# Patient Record
Sex: MALE | Race: WHITE | NOT HISPANIC OR LATINO | URBAN - METROPOLITAN AREA
[De-identification: names, ages, dates, MRNs, and addresses within clinical notes are randomized per-mention and may not be internally consistent; named-entity substitution may affect disease eponyms.]

---

## 2018-11-24 ENCOUNTER — EMERGENCY (EMERGENCY)
Facility: HOSPITAL | Age: 83
LOS: 0 days | Discharge: HOME | End: 2018-11-24
Attending: EMERGENCY MEDICINE | Admitting: EMERGENCY MEDICINE
Payer: MEDICARE

## 2018-11-24 VITALS
HEART RATE: 87 BPM | TEMPERATURE: 98 F | SYSTOLIC BLOOD PRESSURE: 116 MMHG | RESPIRATION RATE: 17 BRPM | OXYGEN SATURATION: 97 % | DIASTOLIC BLOOD PRESSURE: 74 MMHG

## 2018-11-24 VITALS — WEIGHT: 199.96 LBS | HEIGHT: 70 IN

## 2018-11-24 DIAGNOSIS — Z79.82 LONG TERM (CURRENT) USE OF ASPIRIN: ICD-10-CM
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
## 2018-11-24 DIAGNOSIS — E78.5 HYPERLIPIDEMIA, UNSPECIFIED: ICD-10-CM

## 2018-11-24 DIAGNOSIS — S30.0XXA CONTUSION OF LOWER BACK AND PELVIS, INITIAL ENCOUNTER: ICD-10-CM

## 2018-11-24 DIAGNOSIS — E86.0 DEHYDRATION: ICD-10-CM

## 2018-11-24 DIAGNOSIS — R53.1 WEAKNESS: ICD-10-CM
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
## 2018-11-24 LAB
ALBUMIN SERPL ELPH-MCNC: 3 G/DL — LOW (ref 3.3–5)
ALP SERPL-CCNC: 71 U/L — SIGNIFICANT CHANGE UP (ref 40–120)
ALT FLD-CCNC: 51 U/L — SIGNIFICANT CHANGE UP (ref 12–78)
ANION GAP SERPL CALC-SCNC: 9 MMOL/L — SIGNIFICANT CHANGE UP (ref 5–17)
APPEARANCE UR: CLEAR — SIGNIFICANT CHANGE UP
APTT BLD: 29 SEC — SIGNIFICANT CHANGE UP (ref 27.5–36.3)
AST SERPL-CCNC: 57 U/L — HIGH (ref 15–37)
BASOPHILS # BLD AUTO: 0.02 K/UL — SIGNIFICANT CHANGE UP (ref 0–0.2)
BASOPHILS NFR BLD AUTO: 0.5 % — SIGNIFICANT CHANGE UP (ref 0–2)
BILIRUB SERPL-MCNC: 0.7 MG/DL — SIGNIFICANT CHANGE UP (ref 0.2–1.2)
BILIRUB UR-MCNC: NEGATIVE — SIGNIFICANT CHANGE UP
BUN SERPL-MCNC: 39 MG/DL — HIGH (ref 7–23)
CALCIUM SERPL-MCNC: 8.6 MG/DL — SIGNIFICANT CHANGE UP (ref 8.5–10.1)
CHLORIDE SERPL-SCNC: 107 MMOL/L — SIGNIFICANT CHANGE UP (ref 96–108)
CK SERPL-CCNC: 805 U/L — HIGH (ref 26–308)
CO2 SERPL-SCNC: 26 MMOL/L — SIGNIFICANT CHANGE UP (ref 22–31)
COLOR SPEC: YELLOW — SIGNIFICANT CHANGE UP
CREAT SERPL-MCNC: 1.46 MG/DL — HIGH (ref 0.5–1.3)
DIFF PNL FLD: ABNORMAL
EOSINOPHIL # BLD AUTO: 0.15 K/UL — SIGNIFICANT CHANGE UP (ref 0–0.5)
EOSINOPHIL NFR BLD AUTO: 3.8 % — SIGNIFICANT CHANGE UP (ref 0–6)
GLUCOSE SERPL-MCNC: 120 MG/DL — HIGH (ref 70–99)
GLUCOSE UR QL: NEGATIVE MG/DL — SIGNIFICANT CHANGE UP
HCT VFR BLD CALC: 35.2 % — LOW (ref 39–50)
HGB BLD-MCNC: 11.5 G/DL — LOW (ref 13–17)
IMM GRANULOCYTES NFR BLD AUTO: 0.5 % — SIGNIFICANT CHANGE UP (ref 0–1.5)
INR BLD: 1.25 RATIO — HIGH (ref 0.88–1.16)
KETONES UR-MCNC: NEGATIVE — SIGNIFICANT CHANGE UP
LEUKOCYTE ESTERASE UR-ACNC: NEGATIVE — SIGNIFICANT CHANGE UP
LYMPHOCYTES # BLD AUTO: 0.72 K/UL — LOW (ref 1–3.3)
LYMPHOCYTES # BLD AUTO: 18.1 % — SIGNIFICANT CHANGE UP (ref 13–44)
MCHC RBC-ENTMCNC: 30.7 PG — SIGNIFICANT CHANGE UP (ref 27–34)
MCHC RBC-ENTMCNC: 32.7 GM/DL — SIGNIFICANT CHANGE UP (ref 32–36)
MCV RBC AUTO: 94.1 FL — SIGNIFICANT CHANGE UP (ref 80–100)
MONOCYTES # BLD AUTO: 0.35 K/UL — SIGNIFICANT CHANGE UP (ref 0–0.9)
MONOCYTES NFR BLD AUTO: 8.8 % — SIGNIFICANT CHANGE UP (ref 2–14)
NEUTROPHILS # BLD AUTO: 2.71 K/UL — SIGNIFICANT CHANGE UP (ref 1.8–7.4)
NEUTROPHILS NFR BLD AUTO: 68.3 % — SIGNIFICANT CHANGE UP (ref 43–77)
NITRITE UR-MCNC: NEGATIVE — SIGNIFICANT CHANGE UP
PH UR: 5 — SIGNIFICANT CHANGE UP (ref 5–8)
PLATELET # BLD AUTO: 150 K/UL — SIGNIFICANT CHANGE UP (ref 150–400)
POTASSIUM SERPL-MCNC: 3.8 MMOL/L — SIGNIFICANT CHANGE UP (ref 3.5–5.3)
POTASSIUM SERPL-SCNC: 3.8 MMOL/L — SIGNIFICANT CHANGE UP (ref 3.5–5.3)
PROT SERPL-MCNC: 7 GM/DL — SIGNIFICANT CHANGE UP (ref 6–8.3)
PROT UR-MCNC: 15 MG/DL
PROTHROM AB SERPL-ACNC: 14 SEC — HIGH (ref 10–12.9)
RBC # BLD: 3.74 M/UL — LOW (ref 4.2–5.8)
RBC # FLD: 14.2 % — SIGNIFICANT CHANGE UP (ref 10.3–14.5)
SODIUM SERPL-SCNC: 142 MMOL/L — SIGNIFICANT CHANGE UP (ref 135–145)
SP GR SPEC: 1.02 — SIGNIFICANT CHANGE UP (ref 1.01–1.02)
TROPONIN I SERPL-MCNC: <0.015 NG/ML — SIGNIFICANT CHANGE UP (ref 0.01–0.04)
UROBILINOGEN FLD QL: 1 MG/DL
WBC # BLD: 3.97 K/UL — SIGNIFICANT CHANGE UP (ref 3.8–10.5)
WBC # FLD AUTO: 3.97 K/UL — SIGNIFICANT CHANGE UP (ref 3.8–10.5)

## 2018-11-24 PROCEDURE — 99284 EMERGENCY DEPT VISIT MOD MDM: CPT | Mod: 25

## 2018-11-24 PROCEDURE — 74176 CT ABD & PELVIS W/O CONTRAST: CPT | Mod: 26

## 2018-11-24 PROCEDURE — 71045 X-RAY EXAM CHEST 1 VIEW: CPT | Mod: 26

## 2018-11-24 PROCEDURE — 72170 X-RAY EXAM OF PELVIS: CPT | Mod: 26

## 2018-11-24 PROCEDURE — 93010 ELECTROCARDIOGRAM REPORT: CPT

## 2018-11-24 PROCEDURE — 71250 CT THORAX DX C-: CPT | Mod: 26

## 2018-11-24 PROCEDURE — 70450 CT HEAD/BRAIN W/O DYE: CPT | Mod: 26

## 2018-11-24 PROCEDURE — 72125 CT NECK SPINE W/O DYE: CPT | Mod: 26

## 2018-11-24 RX ORDER — SODIUM CHLORIDE 9 MG/ML
1000 INJECTION INTRAMUSCULAR; INTRAVENOUS; SUBCUTANEOUS ONCE
Qty: 0 | Refills: 0 | Status: COMPLETED | OUTPATIENT
Start: 2018-11-24 | End: 2018-11-24

## 2018-11-24 RX ORDER — SODIUM CHLORIDE 9 MG/ML
500 INJECTION INTRAMUSCULAR; INTRAVENOUS; SUBCUTANEOUS ONCE
Qty: 0 | Refills: 0 | Status: DISCONTINUED | OUTPATIENT
Start: 2018-11-24 | End: 2018-11-24

## 2018-11-24 RX ADMIN — SODIUM CHLORIDE 1000 MILLILITER(S): 9 INJECTION INTRAMUSCULAR; INTRAVENOUS; SUBCUTANEOUS at 18:39

## 2018-11-24 RX ADMIN — SODIUM CHLORIDE 1000 MILLILITER(S): 9 INJECTION INTRAMUSCULAR; INTRAVENOUS; SUBCUTANEOUS at 17:37

## 2018-11-24 NOTE — ED PROVIDER NOTE - ENMT, MLM
Normocephalic, atraumatic. No acharya signs or signs of facial trauma. Oropharynx clear, mucous membranes mildly dry. Throat has no vesicles, no oropharyngeal exudates and uvula is midline.

## 2018-11-24 NOTE — ED PROVIDER NOTE - MUSCULOSKELETAL, MLM
Neck nontender, supple. Back with no focal TTP or vertebral step off deformities, posterior thorax stable, nontender. Pelvis nontender, stable. MARSHALL x4, b/l SLR 40 degrees, no focal swelling or tenderness.

## 2018-11-24 NOTE — ED STATDOCS - PROGRESS NOTE DETAILS
Puneet Corrigan for Dr. MARCO Potts: 84 y/o male with a PMHx of HLD, on baby ASA presents to the ED s/p fall yesterday. Pt states he was bending down to pick something up and fell to the floor. Pt notes he laying on the floor for a few hours because was not able to stand up on his own before he was found by son. Now, pt is c/o joint pain and weakness. He reports he normally does not have difficulty standing up. Unsure of hitting head. Daughter states pt was c/o dizziness before the fall. Denies CP, SOB, abd pain, NVD, or painful urination. Uses a cane to ambulate at baseline. Will send pt to main ED for further evaluation. Puneet Corrigan for Dr. MARCO Potts: 82 y/o male with a PMHx of HLD, on baby ASA presents to the ED s/p fall yesterday. Pt states he was bending down to pick something up and fell to the floor. Pt notes he laying on the floor for hours because was not able to stand up on his own before he was found by son. Now, pt is c/o joint pain and weakness. He reports he normally does not have difficulty standing up. Unsure of hitting head. Daughter states pt was c/o dizziness before the fall. Denies CP, SOB, abd pain, NVD, or painful urination. Will send pt to main ED for further evaluation.

## 2018-11-24 NOTE — ED PROVIDER NOTE - CARE PLAN
Principal Discharge DX:	Dehydration  Secondary Diagnosis:	Back contusion, unspecified laterality, initial encounter  Secondary Diagnosis:	General weakness

## 2018-11-24 NOTE — ED ADULT NURSE NOTE - NSIMPLEMENTINTERV_GEN_ALL_ED
Implemented All Fall Risk Interventions:  [place] to call system. Call bell, personal items and telephone within reach. Instruct patient to call for assistance. Room bathroom lighting operational. Non-slip footwear when patient is off stretcher. Physically safe environment: no spills, clutter or unnecessary equipment. Stretcher in lowest position, wheels locked, appropriate side rails in place. Provide visual cue, wrist band, yellow gown, etc. Monitor gait and stability. Monitor for mental status changes and reorient to person, place, and time. Review medications for side effects contributing to fall risk. Reinforce activity limits and safety measures with patient and family.

## 2018-11-24 NOTE — ED PROVIDER NOTE - PROGRESS NOTE DETAILS
Dr. Zimmer:  Reevaluated patient at bedside.  Patient feeling much improved, IVF finishing.  Informed by RN that pt has steady gait w/ cane assistance.  Discussed the results of all diagnostic testing in ED and copies of all reports given.   An opportunity to ask questions was given.  Discussed the importance of prompt, close medical follow-up.  Patient will return with any changes, concerns or persistent / worsening symptoms.  Understanding of all instructions verbalized.

## 2018-11-24 NOTE — ED PROVIDER NOTE - OBJECTIVE STATEMENT
82 y/o male with a PMHx of HLD, on 81mg ASA presents to the ED s/p fall yesterday. Pt states he was bending down to pick something up and fell to the floor. Pt states he was lying on the floor for 2-3 hours until his son found him because he was too weak to stand up without assistance. Pt states he typically does not need assistance standing up. Unknown if hit head. Pt able to ambulate after fall with assistance of cane, but notes not being able to get up from a chair this afternoon without assistance. At time of eval, pt reports generalized weakness. Ate dinner last night without difficulty. Denies nausea, vomiting, head pain. Daughter at bedside states pt never had to use a cane before and "seemed fine" when she last saw him on 11/22/18. No other acute complaints at time of eval.

## 2018-11-24 NOTE — ED ADULT TRIAGE NOTE - CHIEF COMPLAINT QUOTE
pt states he fell this morning approx 7am and was unable to get up until his son found him approx 1 hour later, now c/o pain to buttock. pt is ambulatory at this time.

## 2018-11-24 NOTE — ED PROVIDER NOTE - MEDICAL DECISION MAKING DETAILS
84 y/o M on 81mg ASA daily presents to the ED brought in by family for evaluation of generalized weakness s/p fall yesterday, ? head injury. Pt appears dehydrated but no focal extremity weakness/tenderness. Plan for CT head/c-spine/chest/abd/pelvis non-con, XR pelvis, EKG, labs including troponin, urine, pulse ox monitor, IV fluids, observe, monitor, and reassess.

## 2018-11-24 NOTE — ED STATDOCS - NS_ ATTENDINGSCRIBEDETAILS _ED_A_ED_FT
I, Alvarez Potts MD,  performed the initial face to face bedside interview with this patient regarding history of present illness, review of symptoms and relevant past medical, social and family history.  I completed an independent physical examination.  I was the initial provider who evaluated this patient.  The history, relevant review of systems, past medical and surgical history, medical decision making, and physical examination was documented by the scribe in my presence and I attest to the accuracy of the documentation.

## 2018-11-24 NOTE — ED ADULT NURSE NOTE - OBJECTIVE STATEMENT
Pt BIB family with report of possible fall. Pt is a poor historian. Pt daughter reports that he needed to be helped up from floor by his son sometime within the past two days. Pt unclear on reasons for fall, but does report that he has been feeling generally weak over past few days. Pt denies dizziness or pain at this time.  Pt labs as ordered and taken to CT scan at this time.  No acute sign of injury noted.

## 2018-11-25 LAB
CULTURE RESULTS: NO GROWTH — SIGNIFICANT CHANGE UP
SPECIMEN SOURCE: SIGNIFICANT CHANGE UP

## 2019-02-21 ENCOUNTER — INPATIENT (INPATIENT)
Facility: HOSPITAL | Age: 84
LOS: 1 days | Discharge: HOME | End: 2019-02-23
Attending: INTERNAL MEDICINE | Admitting: INTERNAL MEDICINE
Payer: MEDICARE

## 2019-02-21 VITALS
RESPIRATION RATE: 18 BRPM | DIASTOLIC BLOOD PRESSURE: 67 MMHG | WEIGHT: 220.02 LBS | OXYGEN SATURATION: 100 % | HEIGHT: 70 IN | TEMPERATURE: 98 F | SYSTOLIC BLOOD PRESSURE: 138 MMHG | HEART RATE: 95 BPM

## 2019-02-21 DIAGNOSIS — R09.89 OTHER SPECIFIED SYMPTOMS AND SIGNS INVOLVING THE CIRCULATORY AND RESPIRATORY SYSTEMS: ICD-10-CM

## 2019-02-21 PROBLEM — E78.5 HYPERLIPIDEMIA, UNSPECIFIED: Chronic | Status: ACTIVE | Noted: 2018-11-24

## 2019-02-21 LAB
ACANTHOCYTES BLD QL SMEAR: SLIGHT — SIGNIFICANT CHANGE UP
ADD ON TEST-SPECIMEN IN LAB: SIGNIFICANT CHANGE UP
ALBUMIN SERPL ELPH-MCNC: 3.1 G/DL — LOW (ref 3.3–5)
ALP SERPL-CCNC: 100 U/L — SIGNIFICANT CHANGE UP (ref 40–120)
ALT FLD-CCNC: 24 U/L — SIGNIFICANT CHANGE UP (ref 12–78)
ANION GAP SERPL CALC-SCNC: 6 MMOL/L — SIGNIFICANT CHANGE UP (ref 5–17)
ANISOCYTOSIS BLD QL: SLIGHT — SIGNIFICANT CHANGE UP
APPEARANCE UR: CLEAR — SIGNIFICANT CHANGE UP
APTT BLD: 28.7 SEC — SIGNIFICANT CHANGE UP (ref 27.5–36.3)
APTT BLD: 31.7 SEC — SIGNIFICANT CHANGE UP (ref 27.5–36.3)
AST SERPL-CCNC: 23 U/L — SIGNIFICANT CHANGE UP (ref 15–37)
BASOPHILS # BLD AUTO: 0.02 K/UL — SIGNIFICANT CHANGE UP (ref 0–0.2)
BASOPHILS NFR BLD AUTO: 2 % — SIGNIFICANT CHANGE UP (ref 0–2)
BILIRUB SERPL-MCNC: 0.6 MG/DL — SIGNIFICANT CHANGE UP (ref 0.2–1.2)
BILIRUB UR-MCNC: NEGATIVE — SIGNIFICANT CHANGE UP
BLD GP AB SCN SERPL QL: SIGNIFICANT CHANGE UP
BUN SERPL-MCNC: 34 MG/DL — HIGH (ref 7–23)
CALCIUM SERPL-MCNC: 8.4 MG/DL — LOW (ref 8.5–10.1)
CHLORIDE SERPL-SCNC: 106 MMOL/L — SIGNIFICANT CHANGE UP (ref 96–108)
CO2 SERPL-SCNC: 25 MMOL/L — SIGNIFICANT CHANGE UP (ref 22–31)
COLOR SPEC: YELLOW — SIGNIFICANT CHANGE UP
CREAT SERPL-MCNC: 1.28 MG/DL — SIGNIFICANT CHANGE UP (ref 0.5–1.3)
DACRYOCYTES BLD QL SMEAR: SLIGHT — SIGNIFICANT CHANGE UP
DIFF PNL FLD: NEGATIVE — SIGNIFICANT CHANGE UP
EOSINOPHIL # BLD AUTO: 0.02 K/UL — SIGNIFICANT CHANGE UP (ref 0–0.5)
EOSINOPHIL NFR BLD AUTO: 2 % — SIGNIFICANT CHANGE UP (ref 0–6)
FERRITIN SERPL-MCNC: 777 NG/ML — HIGH (ref 30–400)
GLUCOSE SERPL-MCNC: 105 MG/DL — HIGH (ref 70–99)
GLUCOSE UR QL: NEGATIVE MG/DL — SIGNIFICANT CHANGE UP
HCT VFR BLD CALC: 19.5 % — CRITICAL LOW (ref 39–50)
HCT VFR BLD CALC: 23.2 % — LOW (ref 39–50)
HGB BLD-MCNC: 6.4 G/DL — CRITICAL LOW (ref 13–17)
HGB BLD-MCNC: 7.8 G/DL — LOW (ref 13–17)
HYPOCHROMIA BLD QL: SLIGHT — SIGNIFICANT CHANGE UP
HYPOGRANULAR PLT: PRESENT
INR BLD: 1.16 RATIO — SIGNIFICANT CHANGE UP (ref 0.88–1.16)
INR BLD: 1.25 RATIO — HIGH (ref 0.88–1.16)
KETONES UR-MCNC: NEGATIVE — SIGNIFICANT CHANGE UP
LDH SERPL L TO P-CCNC: 120 U/L — SIGNIFICANT CHANGE UP (ref 84–241)
LEUKOCYTE ESTERASE UR-ACNC: NEGATIVE — SIGNIFICANT CHANGE UP
LYMPHOCYTES # BLD AUTO: 0.64 K/UL — LOW (ref 1–3.3)
LYMPHOCYTES # BLD AUTO: 64 % — HIGH (ref 13–44)
MANUAL SMEAR VERIFICATION: SIGNIFICANT CHANGE UP
MCHC RBC-ENTMCNC: 32.8 GM/DL — SIGNIFICANT CHANGE UP (ref 32–36)
MCHC RBC-ENTMCNC: 33.7 PG — SIGNIFICANT CHANGE UP (ref 27–34)
MCV RBC AUTO: 102.6 FL — HIGH (ref 80–100)
MONOCYTES # BLD AUTO: 0.05 K/UL — SIGNIFICANT CHANGE UP (ref 0–0.9)
MONOCYTES NFR BLD AUTO: 5 % — SIGNIFICANT CHANGE UP (ref 2–14)
NEUTROPHILS # BLD AUTO: 0.27 K/UL — LOW (ref 1.8–7.4)
NEUTROPHILS NFR BLD AUTO: 27 % — LOW (ref 43–77)
NITRITE UR-MCNC: NEGATIVE — SIGNIFICANT CHANGE UP
NRBC # BLD: 0 /100 — SIGNIFICANT CHANGE UP (ref 0–0)
NRBC # BLD: SIGNIFICANT CHANGE UP /100 WBCS (ref 0–0)
NT-PROBNP SERPL-SCNC: 775 PG/ML — HIGH (ref 0–450)
OVALOCYTES BLD QL SMEAR: SIGNIFICANT CHANGE UP
PH UR: 6.5 — SIGNIFICANT CHANGE UP (ref 5–8)
PLAT MORPH BLD: ABNORMAL
PLATELET # BLD AUTO: 48 K/UL — LOW (ref 150–400)
POIKILOCYTOSIS BLD QL AUTO: SIGNIFICANT CHANGE UP
POLYCHROMASIA BLD QL SMEAR: SLIGHT — SIGNIFICANT CHANGE UP
POTASSIUM SERPL-MCNC: 3.8 MMOL/L — SIGNIFICANT CHANGE UP (ref 3.5–5.3)
POTASSIUM SERPL-SCNC: 3.8 MMOL/L — SIGNIFICANT CHANGE UP (ref 3.5–5.3)
PROT SERPL-MCNC: 6.8 GM/DL — SIGNIFICANT CHANGE UP (ref 6–8.3)
PROT UR-MCNC: NEGATIVE MG/DL — SIGNIFICANT CHANGE UP
PROTHROM AB SERPL-ACNC: 12.9 SEC — SIGNIFICANT CHANGE UP (ref 10–12.9)
PROTHROM AB SERPL-ACNC: 14 SEC — HIGH (ref 10–12.9)
RBC # BLD: 1.9 M/UL — LOW (ref 4.2–5.8)
RBC # FLD: 17.5 % — HIGH (ref 10.3–14.5)
RBC BLD AUTO: SIGNIFICANT CHANGE UP
RETICS #: 55.4 K/UL — SIGNIFICANT CHANGE UP (ref 25–125)
RETICS/RBC NFR: 2.9 % — HIGH (ref 0.5–2.5)
ROULEAUX BLD QL SMEAR: PRESENT
SCHISTOCYTES BLD QL AUTO: SLIGHT — SIGNIFICANT CHANGE UP
SODIUM SERPL-SCNC: 137 MMOL/L — SIGNIFICANT CHANGE UP (ref 135–145)
SP GR SPEC: 1.01 — SIGNIFICANT CHANGE UP (ref 1.01–1.02)
TRANSFERRIN SERPL-MCNC: 221 MG/DL — SIGNIFICANT CHANGE UP (ref 200–360)
TROPONIN I SERPL-MCNC: <0.015 NG/ML — SIGNIFICANT CHANGE UP (ref 0.01–0.04)
TYPE + AB SCN PNL BLD: SIGNIFICANT CHANGE UP
UROBILINOGEN FLD QL: NEGATIVE MG/DL — SIGNIFICANT CHANGE UP
WBC # BLD: 1 K/UL — CRITICAL LOW (ref 3.8–10.5)
WBC # FLD AUTO: 1 K/UL — CRITICAL LOW (ref 3.8–10.5)

## 2019-02-21 PROCEDURE — 93010 ELECTROCARDIOGRAM REPORT: CPT

## 2019-02-21 PROCEDURE — 99285 EMERGENCY DEPT VISIT HI MDM: CPT

## 2019-02-21 PROCEDURE — 93970 EXTREMITY STUDY: CPT | Mod: 26

## 2019-02-21 PROCEDURE — 71045 X-RAY EXAM CHEST 1 VIEW: CPT | Mod: 26

## 2019-02-21 RX ORDER — ONDANSETRON 8 MG/1
4 TABLET, FILM COATED ORAL EVERY 6 HOURS
Qty: 0 | Refills: 0 | Status: DISCONTINUED | OUTPATIENT
Start: 2019-02-21 | End: 2019-02-23

## 2019-02-21 RX ORDER — SENNA PLUS 8.6 MG/1
2 TABLET ORAL AT BEDTIME
Qty: 0 | Refills: 0 | Status: DISCONTINUED | OUTPATIENT
Start: 2019-02-21 | End: 2019-02-23

## 2019-02-21 RX ORDER — DOCUSATE SODIUM 100 MG
100 CAPSULE ORAL THREE TIMES A DAY
Qty: 0 | Refills: 0 | Status: DISCONTINUED | OUTPATIENT
Start: 2019-02-21 | End: 2019-02-23

## 2019-02-21 RX ORDER — LOVASTATIN 20 MG
0 TABLET ORAL
Qty: 0 | Refills: 0 | COMMUNITY

## 2019-02-21 RX ORDER — ACETAMINOPHEN 500 MG
650 TABLET ORAL EVERY 6 HOURS
Qty: 0 | Refills: 0 | Status: DISCONTINUED | OUTPATIENT
Start: 2019-02-21 | End: 2019-02-23

## 2019-02-21 NOTE — ED ADULT TRIAGE NOTE - CHIEF COMPLAINT QUOTE
called in by Dr. Moreno to come in for abnormal outpatient lab on 2/20. HGB 6.2, WBC 1.0, Neutrophils 246, PLT 56. Pt in no pain or complaints.

## 2019-02-21 NOTE — H&P ADULT - ASSESSMENT
83 y.o male with PMH of HLD and allergic rhinitis admitted for:     1.  New onset Pancytopenia.   Labs from  November 2018 with mild anemia  No recent viral infections  Possible medication vs BM pathology?  Admit to med/surg  floor   Neutropenic precautions   Hold lovastatin, will clarify how long takes medication  Low fat diet   Will check: Iron studies (sent by ED before transfusion given), Retic count, LDH, Haptoglobin, HIV,  acute hepatitis panel, SPEP, UPEP, PT/PTT/INR , B12/folate   Check Occult blood   getting 2nd Unit PRBCs  Hem/onc eval      2. LE edema   Check b/l LE Doppler   BNP  ECHO   weight daily       3.  Elevated BUN/CR due to dehydration/hypovolemia   vs baseline ( improved since  last ED visit)   Monitor UO and renal FX  UA unremarkable     4. HLD   Hold lovastatin as can cause low PLts, hemolytic anemia and leukopenia   Low fat diet       5. Allergic rhinitis   Nasal  saline     6.  DVT PPxs   Venodynes  if DVT r/o, no     7.  Advanced care planning. Results and POC and GOC  d/w Pt and daughter in details. Daughter Yadira Felix is HCP. Pt states that would like to  attempt CPR trial in case of emergency. FULL CODE   total time spent 12 min

## 2019-02-21 NOTE — H&P ADULT - NSHPPHYSICALEXAM_GEN_ALL_CORE
Vital Signs Last 24 Hrs  T(C): 36.5 (21 Feb 2019 10:05), Max: 36.8 (21 Feb 2019 04:07)  T(F): 97.7 (21 Feb 2019 10:05), Max: 98.3 (21 Feb 2019 04:07)  HR: 80 (21 Feb 2019 10:20) (77 - 95)  BP: 119/72 (21 Feb 2019 10:20) (118/66 - 138/67)  RR: 16 (21 Feb 2019 10:20) (16 - 18)  SpO2: 100% (21 Feb 2019 10:20) (98% - 100%)    PHYSICAL EXAM:  General: Well developed; pale; in no acute distress.  Eyes: PERRLA, EOMI; conjunctiva and sclera clear  Head: Normocephalic; atraumatic  ENMT: No nasal discharge; airway clear.  [place]  Neck: Supple; non tender; no masses  Respiratory: Good air entry. No wheezes, rales or rhonchi  Cardiovascular: Regular rate and rhythm. S1 and S2 Normal; No murmurs  Gastrointestinal: Soft non-tender non-distended; Normal bowel sounds  Genitourinary: No costovertebral angle tenderness, no suprapubic tenderness  Extremities:  Preserved  range of motion, +2 LE edema b/l, b/l hand edema   Vascular: Peripheral pulses palpable 2+ bilaterally  Neurological: Alert and oriented x4, non focal   Skin: Warm and dry. No acute rash  Lymph Nodes: No acute cervical adenopathy  Musculoskeletal: Normal muscle  tone, without deformities  Psychiatric: Cooperative and appropriate

## 2019-02-21 NOTE — H&P ADULT - HISTORY OF PRESENT ILLNESS
83 y.o male with PMH of HLD and allergic rhinitis, hearing loss  was sent by PCP to ED for evaluation due to abnormal lab results. As per  Pt and daughter at beside Pt is being evaluated for generalized weakness which is started about few months ago. associated with lightheadedness, difficult ambulating, b/l LE and hand edema which is fairly new. Also  daughter reports that Pt lost  some weight ( about 20 lbs) as per Pt was cutting down on  some foods, also as per triage records Pt actually gained weight.  No episodes of bleeding or black stools.   Pt denies CP or SOB. as per daughter usually very active and does some work in the garden.  Denies any recent viral or febrile illnesses. No new meds.     In ED labs confirmed pancytopenia.  2U PRBCs ordered,  completed 1 unit

## 2019-02-21 NOTE — ED PROVIDER NOTE - OBJECTIVE STATEMENT
84 yo male with ho hld sent in by pmd Dr. Moreno for leukopenia and anemia. family at bedside states pt has been weak, pt has no complaints

## 2019-02-21 NOTE — ED ADULT NURSE REASSESSMENT NOTE - NS ED NURSE REASSESS COMMENT FT1
Pt was accompanied down to US for lower extremity doppler, upon return pt placed back on cardiac monitor showing NSR, denies pain, offers no complaints, tolerating 2nd PRBC infusion well, awaiting admit bed, will continue to monitor
Assuming care from previous RN, pt A&Ox's 4, resp even and unlabored, color good, showing NSR on monitor, denies pain, offers no complaints, pt with patent 20G IV in LAC and left hand, pt tolerating PRBC infusion well, no s/s of transfusion reaction, awaiting oncology eval and admit bed, pt and family aware of plan of care and verbalizes understanding, will continue to monitor

## 2019-02-21 NOTE — ED ADULT NURSE NOTE - OBJECTIVE STATEMENT
Pt sent by MD for abnormal labs. H/H 6.2 WBC 1.0 PLT 56. Pt denies chest pain, shortness of breath at this time. Pt is able to ambulate and speak in full sentences. Pt denies recent fever. Cardiac monitoring in progress, IV placed. Will continue to monitor

## 2019-02-21 NOTE — H&P ADULT - FAMILY HISTORY
Father  Still living? No  Family history of lung cancer, Age at diagnosis: Age Unknown  Family history of diabetes mellitus, Age at diagnosis: Age Unknown

## 2019-02-21 NOTE — ED ADULT NURSE NOTE - NSIMPLEMENTINTERV_GEN_ALL_ED
Implemented All Universal Safety Interventions:  [place] to call system. Call bell, personal items and telephone within reach. Instruct patient to call for assistance. Room bathroom lighting operational. Non-slip footwear when patient is off stretcher. Physically safe environment: no spills, clutter or unnecessary equipment. Stretcher in lowest position, wheels locked, appropriate side rails in place.

## 2019-02-22 DIAGNOSIS — D61.818 OTHER PANCYTOPENIA: ICD-10-CM

## 2019-02-22 LAB
% ALBUMIN: 54.9 % — SIGNIFICANT CHANGE UP
% ALPHA 1: 6.7 % — SIGNIFICANT CHANGE UP
% ALPHA 2: 9.7 % — SIGNIFICANT CHANGE UP
% BETA: 12.2 % — SIGNIFICANT CHANGE UP
% GAMMA: 16.5 % — SIGNIFICANT CHANGE UP
ALBUMIN SERPL ELPH-MCNC: 3.7 G/DL — SIGNIFICANT CHANGE UP (ref 3.6–5.5)
ALBUMIN/GLOB SERPL ELPH: 1.2 RATIO — SIGNIFICANT CHANGE UP
ALPHA1 GLOB SERPL ELPH-MCNC: 0.5 G/DL — HIGH (ref 0.1–0.4)
ALPHA2 GLOB SERPL ELPH-MCNC: 0.7 G/DL — SIGNIFICANT CHANGE UP (ref 0.5–1)
ANION GAP SERPL CALC-SCNC: 7 MMOL/L — SIGNIFICANT CHANGE UP (ref 5–17)
B-GLOBULIN SERPL ELPH-MCNC: 0.8 G/DL — SIGNIFICANT CHANGE UP (ref 0.5–1)
BASOPHILS # BLD AUTO: 0.01 K/UL — SIGNIFICANT CHANGE UP (ref 0–0.2)
BASOPHILS NFR BLD AUTO: 0.9 % — SIGNIFICANT CHANGE UP (ref 0–2)
BUN SERPL-MCNC: 26 MG/DL — HIGH (ref 7–23)
CALCIUM SERPL-MCNC: 8.1 MG/DL — LOW (ref 8.5–10.1)
CHLORIDE SERPL-SCNC: 109 MMOL/L — HIGH (ref 96–108)
CO2 SERPL-SCNC: 26 MMOL/L — SIGNIFICANT CHANGE UP (ref 22–31)
CREAT SERPL-MCNC: 1 MG/DL — SIGNIFICANT CHANGE UP (ref 0.5–1.3)
EOSINOPHIL # BLD AUTO: 0.03 K/UL — SIGNIFICANT CHANGE UP (ref 0–0.5)
EOSINOPHIL NFR BLD AUTO: 2.6 % — SIGNIFICANT CHANGE UP (ref 0–6)
FOLATE SERPL-MCNC: 9.7 NG/ML — SIGNIFICANT CHANGE UP
GAMMA GLOBULIN: 1.1 G/DL — SIGNIFICANT CHANGE UP (ref 0.6–1.6)
GLUCOSE SERPL-MCNC: 85 MG/DL — SIGNIFICANT CHANGE UP (ref 70–99)
HAPTOGLOB SERPL-MCNC: 115 MG/DL — SIGNIFICANT CHANGE UP (ref 34–200)
HAV IGM SER-ACNC: SIGNIFICANT CHANGE UP
HBV CORE IGM SER-ACNC: SIGNIFICANT CHANGE UP
HBV SURFACE AG SER-ACNC: SIGNIFICANT CHANGE UP
HCT VFR BLD CALC: 22.1 % — LOW (ref 39–50)
HCV AB S/CO SERPL IA: 0.12 S/CO — SIGNIFICANT CHANGE UP (ref 0–0.79)
HCV AB SERPL-IMP: SIGNIFICANT CHANGE UP
HGB BLD-MCNC: 7.4 G/DL — LOW (ref 13–17)
HIV 1+2 AB+HIV1 P24 AG SERPL QL IA: SIGNIFICANT CHANGE UP
IMM GRANULOCYTES NFR BLD AUTO: 0 % — SIGNIFICANT CHANGE UP (ref 0–1.5)
INTERPRETATION SERPL IFE-IMP: SIGNIFICANT CHANGE UP
IRON SATN MFR SERPL: 163 UG/DL — SIGNIFICANT CHANGE UP (ref 45–165)
IRON SATN MFR SERPL: 59 % — HIGH (ref 16–55)
LYMPHOCYTES # BLD AUTO: 0.71 K/UL — LOW (ref 1–3.3)
LYMPHOCYTES # BLD AUTO: 60.7 % — HIGH (ref 13–44)
MCHC RBC-ENTMCNC: 32.7 PG — SIGNIFICANT CHANGE UP (ref 27–34)
MCHC RBC-ENTMCNC: 33.5 GM/DL — SIGNIFICANT CHANGE UP (ref 32–36)
MCV RBC AUTO: 97.8 FL — SIGNIFICANT CHANGE UP (ref 80–100)
MONOCYTES # BLD AUTO: 0.13 K/UL — SIGNIFICANT CHANGE UP (ref 0–0.9)
MONOCYTES NFR BLD AUTO: 11.1 % — SIGNIFICANT CHANGE UP (ref 2–14)
NEUTROPHILS # BLD AUTO: 0.29 K/UL — LOW (ref 1.8–7.4)
NEUTROPHILS NFR BLD AUTO: 24.7 % — LOW (ref 43–77)
NRBC # BLD: 0 /100 WBCS — SIGNIFICANT CHANGE UP (ref 0–0)
PLATELET # BLD AUTO: 42 K/UL — LOW (ref 150–400)
POTASSIUM SERPL-MCNC: 3.7 MMOL/L — SIGNIFICANT CHANGE UP (ref 3.5–5.3)
POTASSIUM SERPL-SCNC: 3.7 MMOL/L — SIGNIFICANT CHANGE UP (ref 3.5–5.3)
PROT PATTERN SERPL ELPH-IMP: SIGNIFICANT CHANGE UP
PROT SERPL-MCNC: 6.8 G/DL — SIGNIFICANT CHANGE UP (ref 6–8.3)
RBC # BLD: 2.26 M/UL — LOW (ref 4.2–5.8)
RBC # FLD: 17.6 % — HIGH (ref 10.3–14.5)
SODIUM SERPL-SCNC: 142 MMOL/L — SIGNIFICANT CHANGE UP (ref 135–145)
TIBC SERPL-MCNC: 274 UG/DL — SIGNIFICANT CHANGE UP (ref 220–430)
UIBC SERPL-MCNC: 111 UG/DL — SIGNIFICANT CHANGE UP (ref 110–370)
VIT B12 SERPL-MCNC: 261 PG/ML — SIGNIFICANT CHANGE UP (ref 232–1245)
WBC # BLD: 1.17 K/UL — LOW (ref 3.8–10.5)
WBC # FLD AUTO: 1.17 K/UL — LOW (ref 3.8–10.5)

## 2019-02-22 PROCEDURE — 99223 1ST HOSP IP/OBS HIGH 75: CPT

## 2019-02-22 NOTE — DIETITIAN INITIAL EVALUATION ADULT. - PERTINENT LABORATORY DATA
02-22 Na142 mmol/L Glu 85 mg/dL K+ 3.7 mmol/L Cr  1.00 mg/dL BUN 26 mg/dL<H> Phos n/a   Alb n/a   PAB n/a

## 2019-02-22 NOTE — CHART NOTE - NSCHARTNOTEFT_GEN_A_CORE
Upon Nutritional Assessment by the Registered Dietitian your patient was determined to meet criteria / has evidence of the following diagnosis/diagnoses:          [ ]  Mild Protein Calorie Malnutrition        [ ]  Moderate Protein Calorie Malnutrition        [ x] Severe Protein Calorie Malnutrition        [ ] Unspecified Protein Calorie Malnutrition        [ ] Underweight / BMI <19        [ ] Morbid Obesity / BMI > 40      Findings as based on:  •  Comprehensive nutrition assessment and consultation  •  Calorie counts (nutrient intake analysis)  •  Food acceptance and intake status from observations by staff  •  Follow up  •  Patient education  •  Intervention secondary to interdisciplinary rounds  •   concerns      Pt meets criteria for severe protein-calorie malnutrition in context of chronic disease.    Nutrition focused physical exam reveals   moderate/severe muscle wasting (temples, clavicles),   moderate muscle wasting (shoulders),   moderate/severe fat wasting (triceps),   moderate fat wasting (ribs.)    PO Intake < 75% nutritional needs > one month, per diet recall.    Edema 3+ in leg may be masking further wt loss, muscle wasting.  2+ in left and right hand.      Treatment:    The following diet has been recommended:  Suggest maintain DASH diet, add Ensure enlive 8 oz tid.    Gelatein BID.   Daily weights.  R  Record PO intake in EMR after each meal (nursing)  Monitor labs, meds, wt        PROVIDER Section:     By signing this assessment you are acknowledging and agree with the diagnosis/diagnoses assigned by the Registered Dietitian    Comments:

## 2019-02-22 NOTE — DIETITIAN INITIAL EVALUATION ADULT. - PERTINENT MEDS FT
MEDICATIONS  (STANDING):    MEDICATIONS  (PRN):  acetaminophen   Tablet .. 650 milliGRAM(s) Oral every 6 hours PRN Temp greater or equal to 38C (100.4F), Mild Pain (1 - 3)  docusate sodium 100 milliGRAM(s) Oral three times a day PRN Constipation  ondansetron Injectable 4 milliGRAM(s) IV Push every 6 hours PRN Nausea  senna 2 Tablet(s) Oral at bedtime PRN Constipation

## 2019-02-22 NOTE — PROGRESS NOTE ADULT - SUBJECTIVE AND OBJECTIVE BOX
83 y.o male with PMH of HLD and allergic rhinitis, hearing loss  was sent by PCP to ED for evaluation due to abnormal lab results. As per  Pt and daughter at beside Pt is being evaluated for generalized weakness which is started about few months ago,  associated with lightheadedness, difficult ambulating, b/l LE and hand edema which is fairly new. Also  daughter reports that Pt lost  some weight ( about 20 lbs) as per Pt was cutting down on  some foods, also as per triage records Pt actually gained weight.  No episodes of bleeding or black stools.   Pt denies CP or SOB. as per daughter usually very active and does some work in the garden- if in the garden that was 5- 6 months ago    2/22: pleasant, seems weak, no c/o; did not walk since he was admitted    Vital Signs Last 24 Hrs  T(C): 37.1 (22 Feb 2019 04:19), Max: 37.1 (22 Feb 2019 04:19)  T(F): 98.7 (22 Feb 2019 04:19), Max: 98.7 (22 Feb 2019 04:19)  HR: 82 (22 Feb 2019 04:19) (75 - 82)  BP: 129/62 (22 Feb 2019 04:19) (111/77 - 131/69)  BP(mean): --  RR: 18 (22 Feb 2019 04:19) (16 - 18)  SpO2: 99% (22 Feb 2019 04:19) (96% - 100%)        	General: Well developed; pale; in no acute distress.  	Eyes: PERRLA, EOMI; conjunctiva and sclera clear  	Head: Normocephalic; atraumatic  	ENMT: No nasal discharge; airway clear.    	Neck: Supple; non tender; no masses  	Respiratory: Good air entry. No wheezes, rales or rhonchi  	Cardiovascular: Regular rate and rhythm. S1 and S2 Normal; No murmurs  	Gastrointestinal: Soft non-tender non-distended; Normal bowel sounds  	Genitourinary: No costovertebral angle tenderness, no suprapubic tenderness  	Extremities:  Preserved  range of motion, +2 LE edema b/l, b/l hand edema   	Vascular: Peripheral pulses palpable 2+ bilaterally  	Neurological: Alert and oriented , mot str 4/5 all extr, seems tired  	Skin: Warm and dry. No acute rash  	Lymph Nodes: No acute cervical adenopathy  	Musculoskeletal: Normal muscle  tone, without deformities      * Pancytopenia - this is not new per Heme  - can have bone marrow as outpatient  - seems weak  - PT eval  - clean urine  - might need Rehab

## 2019-02-22 NOTE — CONSULT NOTE ADULT - PROBLEM SELECTOR RECOMMENDATION 9
Patient with acute pancytopenia; reviewed peripheral smear consistent with depleted trilineage representation, but no immature forms. Suspect  reactive bone marrow process. Will follow up in ambulatory. OK from hematologic perspective for discharge. Patient with acute pancytopenia; reviewed peripheral smear consistent with depleted trilineage representation, but no immature forms. Suspect  reactive bone marrow process. Will follow up in ambulatory for possible bone marrow studies. OK from hematologic perspective for discharge.

## 2019-02-22 NOTE — CONSULT NOTE ADULT - SUBJECTIVE AND OBJECTIVE BOX
KELSEY IBANEZ,  83y Male  MRN: [id number]  ATTENDING: Dr.Mohammad Simmons      HPI:  83 M with history of HLD and allergic rhinitis, hearing loss admitted with pancytopenia, and generalized weakness. Was found with abnormal labs in ambulatory and referred to the hospital. Denies recent infectious process. Family reports 20 lbs weight loss ; also present on physical examination -bilateral lower extremity edema- new. Received 2 units PRBC in ED. No reported melena or BRBPR. Patient denies jaundice, falls or syncopal episodes. Of note, in November 2018, patient presented only with minimal drop in hemoglobin.    PAST MEDICAL & SURGICAL HISTORY:  Allergic rhinitis  HLD (hyperlipidemia)  No significant past surgical history    MEDICATION:      ALLERGIES:  No Known Allergies    FAMILY HISTORY:  Reviewed, non-contributory: [ x ]     SOCIAL HISTORY:  Tobacco: YES [ ]  ; NO [ ]; Former smoker [ x ]  Alcohol:   YES [ ]  ; NO [ ]; Social alcohol user [ x ]  Occupation/ marital status/ children: 2 children involved in patient's care.    REVIEW SYSTEMS:  Constitutional: fever, weight  HEENT: oral thrush / dysphagia  Respiratory: no dyspnea , wheezing, cough  Cardiovascular: denies chest pain, palpitations  GI: no abdominal tenderness / pain; no change in bowel habits  Musculoskeletal: joint pain / swelling  Integumentary: denies pruritus; no skin lesions  Neurologic:    VITALS:  T(C): 37.1, Max: 37.1 (02-22-19 @ 04:19)  T(F): 98.7, Max: 98.7 (02-22-19 @ 04:19)  HR: 82 (75 - 82)  BP: 129/62 (114/66 - 131/69)  SpO2: 99% (96% - 100%)    PHYSICAL EXAM:  General: Well developed; pale; in no acute distress.  	Eyes: PERRLA, EOMI; conjunctiva and sclera clear  	Head: Normocephalic; atraumatic  	ENMT: No nasal discharge; airway clear.    	Neck: Supple; non tender; no masses  	Respiratory: Good air entry. No wheezes, rales or rhonchi  	Cardiovascular: Regular rate and rhythm. S1 and S2 Normal; No murmurs  	Gastrointestinal: Soft non-tender non-distended; Normal bowel sounds  	Genitourinary: No costovertebral angle tenderness, no suprapubic tenderness  	Extremities:  Preserved  range of motion, +2 LE edema b/l, b/l hand edema   	Vascular: Peripheral pulses palpable 2+ bilaterally  	Neurological: Alert and oriented , mot str 4/5 all extr, seems tired  	Skin: Warm and dry. No acute rash  	Lymph Nodes: No acute cervical adenopathy  	Musculoskeletal: Normal muscle  tone, without deformities  LABS:  (02-22) WBC: 1.17 K/uL,Hemoglobin: 7.4 g/dL, Hematocrit: 22.1 %,  Platelet: 42 K/uL  (02-22) Na: 142 mmol/L ; K: 3.7 mmol/L ; BUN: 26 mg/dL ; Cr: 1.00 mg/dL.  PT/INR - ( 21 Feb 2019 19:19 )   PT: 14.0 sec;   INR: 1.25 ratio    PTT - ( 21 Feb 2019 19:19 )  PTT:31.7 sec    RADIOLOGY:  US Duplex Venous Lower Ext Complete, Bilateral (02.21.19 @ 11:56)     IMPRESSION:   No evidence of bilateral lower extremity deep venous thrombosis.  Left-sided popliteal cyst.    Xray Chest 1 View AP/PA. (02.21.19 @ 04:42)   IMPRESSION:  No acute cardiopulmonary findings. KELSEY IBANEZ,  83y Male  MRN: [id number]  ATTENDING: Dr.Mohammad Simmons      HPI:  83 M with history of HLD and allergic rhinitis, hearing loss admitted with pancytopenia, and generalized weakness. Was found with abnormal labs in ambulatory and referred to the hospital. Denies recent infectious process. Family reports 20 lbs weight loss ; also present on physical examination -bilateral lower extremity edema- new. Received 2 units PRBC in ED. No reported melena or BRBPR. Patient denies jaundice, falls or syncopal episodes. Of note, in November 2018, patient presented only with minimal drop in hemoglobin.    PAST MEDICAL & SURGICAL HISTORY:  Allergic rhinitis  HLD (hyperlipidemia)  No significant past surgical history    MEDICATION:  Tylenol  Senna  Ondansetron    ALLERGIES:  No Known Allergies    FAMILY HISTORY:  Reviewed, non-contributory: [ x ]     SOCIAL HISTORY:  Tobacco: YES [ ]  ; NO [ ]; Former smoker [ x ]  Alcohol:   YES [ ]  ; NO [ ]; Social alcohol user [ x ]  Occupation/ marital status/ children: 2 children involved in patient's care.    REVIEW SYSTEMS:  Constitutional: fever, weight  HEENT: oral thrush / dysphagia  Respiratory: no dyspnea , wheezing, cough  Cardiovascular: denies chest pain, palpitations  GI: no abdominal tenderness / pain; no change in bowel habits  Musculoskeletal: joint pain / swelling  Integumentary: denies pruritus; no skin lesions  Neurologic:    VITALS:  T(C): 37.1, Max: 37.1 (02-22-19 @ 04:19)  T(F): 98.7, Max: 98.7 (02-22-19 @ 04:19)  HR: 82 (75 - 82)  BP: 129/62 (114/66 - 131/69)  SpO2: 99% (96% - 100%)    PHYSICAL EXAM:  General: Well developed; pale; in no acute distress.  	Eyes: PERRLA, EOMI; conjunctiva and sclera clear  	Head: Normocephalic; atraumatic  	ENMT: No nasal discharge; airway clear.    	Neck: Supple; non tender; no masses  	Respiratory: Good air entry. No wheezes, rales or rhonchi  	Cardiovascular: Regular rate and rhythm. S1 and S2 Normal; No murmurs  	Gastrointestinal: Soft non-tender non-distended; Normal bowel sounds  	Genitourinary: No costovertebral angle tenderness, no suprapubic tenderness  	Extremities:  Preserved  range of motion, +2 LE edema b/l, b/l hand edema   	Vascular: Peripheral pulses palpable 2+ bilaterally  	Neurological: Alert and oriented , mot str 4/5 all extr, seems tired  	Skin: Warm and dry. No acute rash  	Lymph Nodes: No acute cervical adenopathy  	Musculoskeletal: Normal muscle  tone, without deformities  LABS:  (02-22) WBC: 1.17 K/uL,Hemoglobin: 7.4 g/dL, Hematocrit: 22.1 %,  Platelet: 42 K/uL  (02-22) Na: 142 mmol/L ; K: 3.7 mmol/L ; BUN: 26 mg/dL ; Cr: 1.00 mg/dL.  PT/INR - ( 21 Feb 2019 19:19 )   PT: 14.0 sec;   INR: 1.25 ratio    PTT - ( 21 Feb 2019 19:19 )  PTT:31.7 sec    RADIOLOGY:  US Duplex Venous Lower Ext Complete, Bilateral (02.21.19 @ 11:56)     IMPRESSION:   No evidence of bilateral lower extremity deep venous thrombosis.  Left-sided popliteal cyst.    Xray Chest 1 View AP/PA. (02.21.19 @ 04:42)   IMPRESSION:  No acute cardiopulmonary findings.

## 2019-02-22 NOTE — CONSULT NOTE ADULT - ASSESSMENT
83 M with history of HLD and allergic rhinitis, hearing loss admitted with pancytopenia, and generalized weakness. Was found with abnormal labs in ambulatory and referred to the hospital. Denies recent infectious process. Family reports 20 lbs weight loss ; also present on physical examination -bilateral lower extremity edema- new. Received 2 units PRBC in ED. No reported melena or BRBPR. Patient denies jaundice, falls or syncopal episodes. Of note, in November 2018, patient presented only with minimal drop in hemoglobin.

## 2019-02-22 NOTE — DIETITIAN INITIAL EVALUATION ADULT. - OTHER INFO
Consult for wt loss. PT is  83 y.o male with PMH of HLD and allergic rhinitis, hearing loss  was sent by PCP to ED for evaluation due to abnormal lab results. As per  Pt and daughter at beside Pt is being evaluated for generalized weakness which is started about few months ago. associated with lightheadedness, difficult ambulating, b/l LE and hand edema which is fairly new. Also  daughter reports that Pt lost  some weight ( about 20 lbs) as per Pt was cutting down on  some foods, also as per triage records Pt actually gained weight. Santos 18, skin intact.  Edema 2+ left and right hand.  3+ right and left leg. Dx HLD,pancytopenia. Consult for wt loss. PT is  83 y.o male with PMH of HLD and allergic rhinitis, hearing loss  was sent by PCP to ED for evaluation due to abnormal lab results. As per  Pt and daughter at beside Pt is being evaluated for generalized weakness which is started about few months ago. associated with lightheadedness, difficult ambulating, b/l LE and hand edema which is fairly new. Also  daughter reports that Pt lost  some weight ( about 20 lbs) as per Pt was cutting down on  some foods, also as per triage records Pt actually gained weight. Santos 18, skin intact.  Edema 2+ left and right hand.  3+ right and left leg. Dx HLD,pancytopenia.  .Pt reports that he has lost 40 pounds in past 4-5 months, but does not know his previous weight.  Pt meets criteria for severe protein-calorie malnutrition in context of chronic disease.  nutrition focused physical exam reveals moderate/severe muscle wasting (temples, clavicles), moderate muscle wasting (shoulders), moderate/severe fat wasting (triceps), moderate fat wasting (ribs.)  PO Intake < 75% nutritional needs > one month, per diet recall.  Edema 3+ in leg may be masking further wt loss, muscle wasting.  2+ in left and right hand.  Suggest maintain DASH diet, add Ensure enlive 8 oz tid.  Gelatein BID. Daily weights.  Record PO intake in EMR after each meal (nursing).  Monitor labs, meds and wt.

## 2019-02-22 NOTE — DIETITIAN INITIAL EVALUATION ADULT. - ENERGY NEEDS
Ht.  70    "        Wt.  96     kg               BMI      30            IBW   103    kg               Pt is at  93  %  IBW

## 2019-02-23 ENCOUNTER — TRANSCRIPTION ENCOUNTER (OUTPATIENT)
Age: 84
End: 2019-02-23

## 2019-02-23 VITALS
DIASTOLIC BLOOD PRESSURE: 56 MMHG | OXYGEN SATURATION: 99 % | TEMPERATURE: 98 F | HEART RATE: 76 BPM | SYSTOLIC BLOOD PRESSURE: 111 MMHG | RESPIRATION RATE: 17 BRPM

## 2019-02-23 LAB
ANISOCYTOSIS BLD QL: SLIGHT — SIGNIFICANT CHANGE UP
BASOPHILS # BLD AUTO: 0.01 K/UL — SIGNIFICANT CHANGE UP (ref 0–0.2)
BASOPHILS NFR BLD AUTO: 1 % — SIGNIFICANT CHANGE UP (ref 0–2)
BIZARRE PLATELETS BLD QL SMEAR: PRESENT — SIGNIFICANT CHANGE UP
BURR CELLS BLD QL SMEAR: PRESENT — SIGNIFICANT CHANGE UP
DACRYOCYTES BLD QL SMEAR: SLIGHT — SIGNIFICANT CHANGE UP
ELLIPTOCYTES BLD QL SMEAR: SLIGHT — SIGNIFICANT CHANGE UP
EOSINOPHIL # BLD AUTO: 0.04 K/UL — SIGNIFICANT CHANGE UP (ref 0–0.5)
EOSINOPHIL NFR BLD AUTO: 3 % — SIGNIFICANT CHANGE UP (ref 0–6)
HCT VFR BLD CALC: 25.4 % — LOW (ref 39–50)
HGB BLD-MCNC: 8.5 G/DL — LOW (ref 13–17)
HYPOCHROMIA BLD QL: SLIGHT — SIGNIFICANT CHANGE UP
LYMPHOCYTES # BLD AUTO: 0.77 K/UL — LOW (ref 1–3.3)
LYMPHOCYTES # BLD AUTO: 61 % — HIGH (ref 13–44)
MACROCYTES BLD QL: SLIGHT — SIGNIFICANT CHANGE UP
MANUAL SMEAR VERIFICATION: SIGNIFICANT CHANGE UP
MCHC RBC-ENTMCNC: 32 PG — SIGNIFICANT CHANGE UP (ref 27–34)
MCHC RBC-ENTMCNC: 33.5 GM/DL — SIGNIFICANT CHANGE UP (ref 32–36)
MCV RBC AUTO: 95.5 FL — SIGNIFICANT CHANGE UP (ref 80–100)
MONOCYTES # BLD AUTO: 0.13 K/UL — SIGNIFICANT CHANGE UP (ref 0–0.9)
MONOCYTES NFR BLD AUTO: 10 % — SIGNIFICANT CHANGE UP (ref 2–14)
NEUTROPHILS # BLD AUTO: 0.24 K/UL — LOW (ref 1.8–7.4)
NEUTROPHILS NFR BLD AUTO: 16 % — LOW (ref 43–77)
NEUTS BAND # BLD: 3 % — SIGNIFICANT CHANGE UP (ref 0–8)
NRBC # BLD: 0 /100 — SIGNIFICANT CHANGE UP (ref 0–0)
NRBC # BLD: 2 /100 WBCS — HIGH (ref 0–0)
OVALOCYTES BLD QL SMEAR: SLIGHT — SIGNIFICANT CHANGE UP
PLAT MORPH BLD: NORMAL — SIGNIFICANT CHANGE UP
PLATELET # BLD AUTO: 39 K/UL — LOW (ref 150–400)
PLATELET COUNT - ESTIMATE: ABNORMAL
POIKILOCYTOSIS BLD QL AUTO: SLIGHT — SIGNIFICANT CHANGE UP
RBC # BLD: 2.66 M/UL — LOW (ref 4.2–5.8)
RBC # FLD: 17.6 % — HIGH (ref 10.3–14.5)
RBC BLD AUTO: ABNORMAL
VARIANT LYMPHS # BLD: 6 % — SIGNIFICANT CHANGE UP (ref 0–6)
WBC # BLD: 1.26 K/UL — LOW (ref 3.8–10.5)
WBC # FLD AUTO: 1.26 K/UL — LOW (ref 3.8–10.5)

## 2019-02-23 RX ORDER — ASPIRIN/CALCIUM CARB/MAGNESIUM 324 MG
1 TABLET ORAL
Qty: 0 | Refills: 0 | COMMUNITY

## 2019-02-23 NOTE — DISCHARGE NOTE ADULT - PATIENT PORTAL LINK FT
You can access the [facility] Patient Portal, offered by [facility], by registering with the following website: [email or web address]

## 2019-02-23 NOTE — DISCHARGE NOTE ADULT - HOSPITAL COURSE
83 y.o male with PMH of HLD and allergic rhinitis, hearing loss  was sent by PCP to ED for evaluation due to abnormal lab results. As per  Pt and daughter at beside Pt is being evaluated for generalized weakness which is started about few months ago,  associated with lightheadedness, difficult ambulating, b/l LE and hand edema which is fairly new. Also  daughter reports that Pt lost  some weight ( about 20 lbs) as per Pt was cutting down on  some foods, also as per triage records Pt actually gained weight.  No episodes of bleeding or black stools.   Pt denies CP or SOB. as per daughter usually very active and does some work in the garden- if in the garden that was 5- 6 months ago      	General: Well developed; pale; in no acute distress.  	Eyes: PERRLA, EOMI; conjunctiva and sclera clear  	Head: Normocephalic; atraumatic  	ENMT: No nasal discharge; airway clear.    	Neck: Supple; non tender; no masses  	Respiratory: Good air entry. No wheezes, rales or rhonchi  	Cardiovascular: Regular rate and rhythm. S1 and S2 Normal; No murmurs  	Gastrointestinal: Soft non-tender non-distended; Normal bowel sounds no mass felt  	Genitourinary: No costovertebral angle tenderness, no suprapubic tenderness  	Extremities:  Preserved  range of motion, +2 LE edema b/l, b/l hand edema   	Vascular: Peripheral pulses palpable 2+ bilaterally  	Neurological: Alert and oriented , mot str 4/5 all extr, seems tired  	Skin: Warm and dry. No acute rash  	Lymph Nodes: No acute cervical adenopathy  	Musculoskeletal: Normal muscle  tone, without deformities      * Pancytopenia - WBC with minimal recovery; guaiac neg bedside; predominance lymphocytes  - can have bone marrow as outpatient per Heme; Hb 8.5 after 3 units of blood; hold asa for now  - wt loss to be addressed by PCP; w/up not indicated as per Heme; case d/w daughter, aware that complete work up necessary re: wt loss and would rather have this done as an outpatient CT, colonoscopy, etc afraid dad will deteriorate in hospital ( weakness confusion). Aware no NSAID

## 2019-02-23 NOTE — DISCHARGE NOTE ADULT - CARE PROVIDER_API CALL
Bri Bynum)  Hematology; Internal Medicine; Medical Oncology  [address] [place] [address]  Phone: [phone]  Fax: [phone]  Follow Up Time:

## 2019-02-23 NOTE — DISCHARGE NOTE ADULT - CARE PLAN
Principal Discharge DX:	Pancytopenia  Goal:	f/up with Hematology  Assessment and plan of treatment:	stop aspirin platelets low risk for bleeding

## 2019-02-23 NOTE — DISCHARGE NOTE ADULT - MEDICATION SUMMARY - MEDICATIONS TO TAKE
I will START or STAY ON the medications listed below when I get home from the hospital:    lovastatin  -- Indication: For cholesterol

## 2019-02-25 ENCOUNTER — LABORATORY RESULT (OUTPATIENT)
Age: 84
End: 2019-02-25

## 2019-02-25 ENCOUNTER — APPOINTMENT (OUTPATIENT)
Age: 84
End: 2019-02-25
Payer: MEDICARE

## 2019-02-25 VITALS
SYSTOLIC BLOOD PRESSURE: 115 MMHG | DIASTOLIC BLOOD PRESSURE: 63 MMHG | TEMPERATURE: 98.4 F | HEART RATE: 72 BPM | HEIGHT: 69 IN | BODY MASS INDEX: 30.21 KG/M2 | WEIGHT: 204 LBS

## 2019-02-25 PROBLEM — Z00.00 ENCOUNTER FOR PREVENTIVE HEALTH EXAMINATION: Status: ACTIVE | Noted: 2019-02-25

## 2019-02-25 PROBLEM — J30.9 ALLERGIC RHINITIS, UNSPECIFIED: Chronic | Status: ACTIVE | Noted: 2019-02-21

## 2019-02-25 LAB
HCT VFR BLD CALC: 26.6 %
HGB BLD-MCNC: 9.02 G/DL
MCHC RBC-ENTMCNC: 32.4 PG
MCHC RBC-ENTMCNC: 33.9 GM/DL
MCV RBC AUTO: 95.6 FL
PLATELET # BLD AUTO: 46 K/UL
RBC # BLD: 2.79 M/UL
RBC # FLD: 15.7 %
WBC # FLD AUTO: 0.7 K/UL

## 2019-02-25 PROCEDURE — 36415 COLL VENOUS BLD VENIPUNCTURE: CPT

## 2019-02-25 PROCEDURE — 85025 COMPLETE CBC W/AUTO DIFF WBC: CPT

## 2019-02-26 ENCOUNTER — APPOINTMENT (OUTPATIENT)
Age: 84
End: 2019-02-26
Payer: MEDICARE

## 2019-02-26 VITALS
SYSTOLIC BLOOD PRESSURE: 104 MMHG | HEART RATE: 134 BPM | BODY MASS INDEX: 29.98 KG/M2 | TEMPERATURE: 97.8 F | DIASTOLIC BLOOD PRESSURE: 64 MMHG | WEIGHT: 203 LBS

## 2019-02-26 VITALS
DIASTOLIC BLOOD PRESSURE: 64 MMHG | SYSTOLIC BLOOD PRESSURE: 104 MMHG | TEMPERATURE: 97.8 F | BODY MASS INDEX: 30.07 KG/M2 | HEART RATE: 134 BPM | HEIGHT: 69 IN | WEIGHT: 203 LBS

## 2019-02-26 VITALS — DIASTOLIC BLOOD PRESSURE: 62 MMHG | HEART RATE: 71 BPM | SYSTOLIC BLOOD PRESSURE: 99 MMHG

## 2019-02-26 DIAGNOSIS — Z80.1 FAMILY HISTORY OF MALIGNANT NEOPLASM OF TRACHEA, BRONCHUS AND LUNG: ICD-10-CM

## 2019-02-26 DIAGNOSIS — Z92.89 PERSONAL HISTORY OF OTHER MEDICAL TREATMENT: ICD-10-CM

## 2019-02-26 DIAGNOSIS — E78.5 HYPERLIPIDEMIA, UNSPECIFIED: ICD-10-CM

## 2019-02-26 DIAGNOSIS — D61.818 OTHER PANCYTOPENIA: ICD-10-CM

## 2019-02-26 DIAGNOSIS — Z78.9 OTHER SPECIFIED HEALTH STATUS: ICD-10-CM

## 2019-02-26 DIAGNOSIS — Z86.69 PERSONAL HISTORY OF OTHER DISEASES OF THE NERVOUS SYSTEM AND SENSE ORGANS: ICD-10-CM

## 2019-02-26 DIAGNOSIS — Z86.19 PERSONAL HISTORY OF OTHER INFECTIOUS AND PARASITIC DISEASES: ICD-10-CM

## 2019-02-26 DIAGNOSIS — Z80.3 FAMILY HISTORY OF MALIGNANT NEOPLASM OF BREAST: ICD-10-CM

## 2019-02-26 DIAGNOSIS — R60.0 LOCALIZED EDEMA: ICD-10-CM

## 2019-02-26 LAB
ALBUMIN SERPL ELPH-MCNC: 3.6 G/DL
ALP BLD-CCNC: 116 U/L
ALT SERPL-CCNC: 46 U/L
ANION GAP SERPL CALC-SCNC: 9 MMOL/L
AST SERPL-CCNC: 39 U/L
B2 MICROGLOB SERPL-MCNC: 3.3 MG/L
BILIRUB SERPL-MCNC: 0.9 MG/DL
BUN SERPL-MCNC: 43 MG/DL
CALCIUM SERPL-MCNC: 8.9 MG/DL
CHLORIDE SERPL-SCNC: 106 MMOL/L
CO2 SERPL-SCNC: 25 MMOL/L
CREAT SERPL-MCNC: 1.14 MG/DL
FERRITIN SERPL-MCNC: 980 NG/ML
FOLATE SERPL-MCNC: 17.4 NG/ML
GLUCOSE SERPL-MCNC: 90 MG/DL
LDH SERPL-CCNC: 157 U/L
POTASSIUM SERPL-SCNC: 3.9 MMOL/L
PROT SERPL-MCNC: 6.2 G/DL
SODIUM SERPL-SCNC: 140 MMOL/L
VIT B12 SERPL-MCNC: 272 PG/ML

## 2019-02-26 PROCEDURE — 36415 COLL VENOUS BLD VENIPUNCTURE: CPT

## 2019-02-26 PROCEDURE — 38222 DX BONE MARROW BX & ASPIR: CPT

## 2019-02-26 PROCEDURE — 99215 OFFICE O/P EST HI 40 MIN: CPT | Mod: 25

## 2019-02-27 DIAGNOSIS — E43 UNSPECIFIED SEVERE PROTEIN-CALORIE MALNUTRITION: ICD-10-CM

## 2019-02-27 DIAGNOSIS — E78.5 HYPERLIPIDEMIA, UNSPECIFIED: ICD-10-CM

## 2019-02-27 DIAGNOSIS — D61.818 OTHER PANCYTOPENIA: ICD-10-CM

## 2019-02-27 DIAGNOSIS — E86.0 DEHYDRATION: ICD-10-CM

## 2019-02-27 DIAGNOSIS — Z79.899 OTHER LONG TERM (CURRENT) DRUG THERAPY: ICD-10-CM

## 2019-02-27 DIAGNOSIS — Z79.82 LONG TERM (CURRENT) USE OF ASPIRIN: ICD-10-CM

## 2019-02-27 DIAGNOSIS — J30.9 ALLERGIC RHINITIS, UNSPECIFIED: ICD-10-CM

## 2019-02-27 LAB
EPO SERPL-MCNC: 63.4 MIU/ML
M PROTEIN SPEC IFE-MCNC: NORMAL

## 2019-02-27 NOTE — CONSULT LETTER
[Consult Letter:] : I had the pleasure of evaluating your patient, [unfilled]. [Please see my note below.] : Please see my note below. [Consult Closing:] : Thank you very much for allowing me to participate in the care of this patient.  If you have any questions, please do not hesitate to contact me. [Sincerely,] : Sincerely, [Dear  ___] : Dear  [unfilled], [FreeTextEntry2] : Richa Malone M.D. [FreeTextEntry3] : TYRESE PEOPLES M.D.\par Hematology/ Oncology\par [facility]\par \par

## 2019-02-27 NOTE — REASON FOR VISIT
[Initial Consultation] : an initial consultation for [Family Member] : family member [FreeTextEntry2] : pancytopenia

## 2019-02-27 NOTE — PROCEDURE
[Bone Marrow Biopsy] : bone marrow biopsy [Bone Marrow Aspiration] : bone marrow aspiration  [Patient] : the patient [Verbal Consent Obtained] : verbal consent was obtained prior to the procedure [Patient identification verified] : patient identification verified [Procedure verified and consent obtained] : procedure verified and consent obtained [Correct positioning] : correct positioning [Other ___] : [unfilled] [Superior iliac spine was identified] : the superior iliac spine was identified. [Lidocaine was injected and into the periosteum overlying the site.] : Lidocaine was injected and into the periosteum overlying the site. [Aspirate] : aspirate [Cytogenetics] : cytogenetics [FISH] : FISH [Biopsy] : biopsy [Flow Cytometry] : flow cytometry [] : The patient was instructed to remove the bandage the following AM. The patient may bathe. Acetaminophen may be taken for discomfort, as per package directions.If there are any other problems, the patient was instructed to call the office. The patient verbalized understanding, and is aware of the office contact numbers. [Laterality verified and correct site marked] : laterality verified and correct site marked [Right] : site: right [Left lateral decibitus position] : left lateral decibitus position [The right posterior iliac crest was prepped with betadine and draped, using sterile technique.] : The right posterior iliac crest was prepped with betadine and draped, using sterile technique. [FreeTextEntry1] : pancytopenia

## 2019-02-27 NOTE — ASSESSMENT
[FreeTextEntry1] : Mr. IBANEZ and his son 's questions were answered to their satisfaction. He  expressed his  understanding and willingness to comply with the above recommendations, and  will return to the office to review the results of the bone marrow studies in 2 weeks.\par \par \par

## 2019-02-27 NOTE — REVIEW OF SYSTEMS
[Fatigue] : fatigue [Recent Change In Weight] : ~T recent weight change [Loss of Hearing] : loss of hearing [SOB on Exertion] : shortness of breath during exertion [Negative] : Allergic/Immunologic [Fever] : no fever [Chills] : no chills [Dysphagia] : no dysphagia [Mucosal Pain] : no mucosal pain [Wheezing] : no wheezing [Cough] : no cough [Easy Bleeding] : no tendency for easy bleeding [Easy Bruising] : no tendency for easy bruising [FreeTextEntry2] : some weight loss; feeling fatigued [de-identified] : pancytopenia

## 2019-02-27 NOTE — HISTORY OF PRESENT ILLNESS
[de-identified] : 83 M, , [place] descent, with hyperlipidemia, admitted at [facility] February 21, 2019 with pancytopenia. Patient has been complaining of generalized weakness for a few months, accompanied of occasional lightheadedness and difficulty walking. There has also been a weight loss reported. In hospital, patient was found with a WBC 1K, hemoglobin 6.4 g/dL, and platelet 48,000.Peripheral smear failed to indicate immature forms. Patient denies recent infections. He received transfusion of 2 units of packed red cell. Repeat BW in ambulatory showed a further decrease in WBC at 0.7 K with . Patient is more fatigued, but denies pain.Did not receive G-CSF yet. Accompanied by son, Rober. [FreeTextEntry1] : for bone marrow biopsy today

## 2019-02-27 NOTE — RESULTS/DATA
[FreeTextEntry1] : 2/25/19:\par WBC 0.7, hemoglobin 9 g/dL, hematocrit 26.6%, platelets 46,000\par Ferritin 918 ng per mL\par \par \par MCV 95.6\par \par 11/24/18:\par WBC 3.97, hemoglobin 11.5 g/dL, hematocrit 35.2%, platelets 150,000

## 2019-02-28 LAB — T(9;22)(ABL1,BCR)/CONTROL BLD/T: NORMAL

## 2019-03-05 LAB — JAK2 GENE MUT ANL BLD/T: NORMAL

## 2020-01-10 NOTE — ED PROVIDER NOTE - NS ED MD DISPO DIVISION
I reviewed the H&P, I examined the patient, and there are no changes in the patient's condition.    
[facility]

## 2020-09-08 NOTE — DIETITIAN INITIAL EVALUATION ADULT. - EST PROTEIN NEEDS5
End of Shift Note: Surgical    Procedure:  8/28/2020 - 8/31/2020 8 Days Post-Op  Right Femur ORIF  Pain Management: Last Pain Score: 3/10  Medication Scheduled Tylenol.    Diet: Soft bland  Bowel Function:Diarrhea- Patient incontinent of stool  LBM: 9/8/20- loose, dark brown  Dressing: Right leg Dressing clean, dry and intact. Knee immobilizer in place    Anticoagulant: Medication:  Coumadin INR 3.4 this AM  Activity:Up with Michelle fritz- Q2 Turns while in bed  LDAs: peripheral IV  Discharge Plan: Anticipated discharge date:  9/9.                     Disposition:   Skilled Nursing Facility              96

## 2022-04-27 NOTE — ED ADULT TRIAGE NOTE - BMI (KG/M2)
Physical Therapy Discharge Summary    Reason for therapy discharge:    Change in medical status. Pt transitioned to comfort cares.    Progress towards therapy goal(s). See goals on Care Plan in Epic electronic health record for goal details.  Goals not met.  Barriers to achieving goals:   limited tolerance for therapy and transitioned to comfort focused cares.    Therapy recommendation(s):    No further therapy is recommended.       28.7

## 2025-01-13 NOTE — ED PROVIDER NOTE - OTHER FINDINGS
Copied from CRM #[id number]. Topic: MW Schedule Appointment - MW Schedule Adult Specialty  >> Jan 13, 2025 11:46 AM Karina MARS wrote:  Leatha Saldivar called to schedule an appointment with a specialist (who has not been converted to enhanced scheduling).    [facility] schedules; Active Referral not required. >Checked insurance.  >Patient is in [place]; Checked KB if referral is needed    >Scheduled following instructions listed on the Care Site Dept KB page.   >Readback appointment details.    >Appt is in 3 days or less.  >Selected 'Wrap Up CRM'  >Created new Telephone Encounter after clicking 'Convert to Clinical Call'.  >Selected reason for call 'Appointment'.  >Sent Appointment template and routed as routine priority per Clinician KB page to appropriate clinician pool.-- DO NOT REPLY / DO NOT REPLY ALL --  -- This inbox is not monitored. If this was sent to the wrong provider or department, reroute message to P [facility] Reroute pool. --  -- Message is from Engagement Center Operations ([facility]) --  Reason for Appointment Message: Non-Acute appointment scheduled in less than 3-days. Sending as an FYI.      
Poor baseline, no ectopy